# Patient Record
Sex: FEMALE | Race: WHITE | NOT HISPANIC OR LATINO | Employment: STUDENT | URBAN - METROPOLITAN AREA
[De-identification: names, ages, dates, MRNs, and addresses within clinical notes are randomized per-mention and may not be internally consistent; named-entity substitution may affect disease eponyms.]

---
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
## 2020-09-21 ENCOUNTER — OFFICE VISIT (OUTPATIENT)
Dept: OTOLARYNGOLOGY | Facility: CLINIC | Age: 20
End: 2020-09-21
Payer: COMMERCIAL

## 2020-09-21 VITALS — BODY MASS INDEX: 24.53 KG/M2 | HEIGHT: 65 IN | WEIGHT: 147.25 LBS

## 2020-09-21 DIAGNOSIS — J03.91 RECURRENT TONSILLITIS: Primary | ICD-10-CM

## 2020-09-21 DIAGNOSIS — J35.1 TONSILLAR HYPERTROPHY: ICD-10-CM

## 2020-09-21 DIAGNOSIS — R06.83 SNORING: ICD-10-CM

## 2020-09-21 PROCEDURE — 99999 PR PBB SHADOW E&M-EST. PATIENT-LVL III: ICD-10-PCS | Mod: PBBFAC,,, | Performed by: OTOLARYNGOLOGY

## 2020-09-21 PROCEDURE — 99204 PR OFFICE/OUTPT VISIT, NEW, LEVL IV, 45-59 MIN: ICD-10-PCS | Mod: S$GLB,,, | Performed by: OTOLARYNGOLOGY

## 2020-09-21 PROCEDURE — 3008F BODY MASS INDEX DOCD: CPT | Mod: CPTII,S$GLB,, | Performed by: OTOLARYNGOLOGY

## 2020-09-21 PROCEDURE — 99204 OFFICE O/P NEW MOD 45 MIN: CPT | Mod: S$GLB,,, | Performed by: OTOLARYNGOLOGY

## 2020-09-21 PROCEDURE — 3008F PR BODY MASS INDEX (BMI) DOCUMENTED: ICD-10-PCS | Mod: CPTII,S$GLB,, | Performed by: OTOLARYNGOLOGY

## 2020-09-21 PROCEDURE — 99999 PR PBB SHADOW E&M-EST. PATIENT-LVL III: CPT | Mod: PBBFAC,,, | Performed by: OTOLARYNGOLOGY

## 2020-09-21 RX ORDER — PREDNISONE 20 MG/1
40 TABLET ORAL DAILY
Qty: 10 TABLET | Refills: 0 | Status: SHIPPED | OUTPATIENT
Start: 2020-09-21 | End: 2020-09-26

## 2020-09-21 NOTE — PATIENT INSTRUCTIONS
Oral Steroid    You have been prescribed an oral steroid. Use as directed.    Note: This medication can be very effective in treating inflammation but may be associated with several side effects such as:    Stomach irritation (consider antacid medication while you are on prednisone),  insomnia (please take in the morning to reduce this if dosing is once daily), mood changes, high blood pressure, elevated sugar levels (especially in diabetics), infections, fluid retention, rash, swelling, tendon rupture, and hip fracture.     Please report any liver disease, diabetes, osteoporosis, stomach ulcer disease, glaucoma, history of GI bleeding, Myasthenia Gravis PRIOR to initiating this medication.      Understanding Tonsillectomy and Adenoidectomy    Tonsils and adenoids are clusters of tissues in the back of the throat. These tissues form part of the bodys immune system, which helps the body fight disease. If these structures repeatedly become infected or become enlarged, they can lead to problems. They may then be removed with surgery. Surgery to remove the tonsils is called tonsillectomy. In some cases, the adenoids are also removed. This is called adenoidectomy.  Why tonsillectomy and adenoidectomy are done  You may have your tonsils, adenoids, or both removed for reasons that include:  · Infection of the tonsils (tonsillitis) that keeps coming back  · Repeated infections of the throat  · Enlargement of the tonsils or adenoids that affects breathing during sleep. This causes a condition called obstructive sleep apnea.  · Suspected cancer of the throat  Tonsillectomy can remove part or all of the tonsils.  How tonsillectomy and adenoidectomy are done  This surgery is done in a hospital or surgery center. It usually takes less than 1 hour.  · An IV line is inserted in a vein in your arm or hand. This gives you fluids and medicines.  · You are given general anesthesia to put you into a deep sleep through the  procedure.  · A special device is used to hold your mouth open. A tube is put down into your throat to help keep your airway open during the procedure.  · The doctor uses surgical tools to remove the tonsils and possibly the adenoids.  · The doctor removes all of the tools.  · You are sent home when you are awake and recovered from the anesthesia.  Risks of tonsillectomy and adenoidectomy  Risks include:  · Bleeding  · Electric burns of the mouth and lip  · Infection  · Injury to the lips or teeth  · Numbness of the tongue  · Risks of anesthesia  · The need for a second surgery  · Voice changes  Date Last Reviewed: 6/1/2016  © 4415-2715 [employer]. [address] [place] [address], [place] [address]. All rights reserved. This information is not intended as a substitute for professional medical care. Always follow your healthcare professional's instructions.

## 2020-09-21 NOTE — PROGRESS NOTES
Subjective:       Patient ID: Melinda Hanson is a 19 y.o. female.    Chief Complaint: Enlarged tonsils    Melinda is here for tonsil issues.  She has a history of recurrent pharyngitis.  She has been given antibiotics and steroid 3-4 times per year over the past 3 years.  Her throat pain does not resolve prior to the use of antibiotics. Issues worsened over the past few years since starting college. She denies reflux or heartburn. She does snore nightly. She denies sleep concerns or witnessed apneas.  Sleep quality is reduced when she is ill.  Denies history of bleeding disorders.   She had Mono 4 years ago.     Pertinent medical issues: Scoliosis  Previous surgery: Lumbar fusion    Social History     Tobacco Use   Smoking Status Never Smoker   Smokeless Tobacco Never Used     Social History     Substance and Sexual Activity   Alcohol Use No          Review of Systems   Constitutional: Negative for activity change and appetite change.   Eyes: Negative for discharge.   Respiratory: Negative for difficulty breathing and wheezing   Cardiovascular: Negative for chest pain.   Gastrointestinal: Negative for abdominal distention and abdominal pain.   Endocrine: Negative for cold intolerance and heat intolerance.   Genitourinary: Negative for dysuria.   Musculoskeletal: Negative for gait problem and joint swelling.   Skin: Negative for color change and pallor.   Neurological: Negative for syncope and weakness.   Psychiatric/Behavioral: Negative for agitation and confusion.     Objective:        Constitutional:   She is oriented to person, place, and time. She appears well-developed and well-nourished. She appears alert. She is active. Normal speech.      Head:  Normocephalic and atraumatic. Head is without TMJ tenderness. No scars. Salivary glands normal.  Facial strength is normal.      Ears:    Right Ear: No drainage or swelling. No middle ear effusion.   Left Ear: No drainage or swelling.  No middle ear effusion.      Nose:  No mucosal edema, rhinorrhea or sinus tenderness. No turbinate hypertrophy.      Mouth/Throat  Oropharynx clear and moist without lesions or asymmetry, normal uvula midline and mirror exam normal. Normal dentition. No uvula swelling, lacerations or trismus. No oropharyngeal exudate. Tonsils present, +4, tonsillar erythema.  Tonsillar exudate.      Neck:  Full range of motion with neck supple and no adenopathy. Thyroid tenderness is present. No tracheal deviation, no edema, no erythema, normal range of motion, no stridor, no crepitus and no neck rigidity present. No thyroid mass present.     She has cervical adenopathy.     Cardiovascular:   Intact distal pulses and normal pulses.      Pulmonary/Chest:   Effort normal and breath sounds normal. No stridor.     Psychiatric:   Her speech is normal and behavior is normal. Her mood appears not anxious. Her affect is not labile.     Neurological:   She is alert and oriented to person, place, and time. No sensory deficit.     Skin:   No abrasions, lacerations, lesions, or rashes. No abrasion and no bruising noted.         Tests / Results:  none    Assessment:       1. Recurrent tonsillitis    2. Tonsillar hypertrophy    3. Snoring          Plan:       Prednisone x5 days  Continue monitoring, initiate antibiotics if persistent after 7-10 days.  Will message me.    Discussed options of observation vs. Tonsillectomy.    I discussed the risks of tonsillectomy/adenoidectomy, including bleeding, recurrence/persistence of issues (regrowth), need for further procedures, taste changes, injury to mouth/lips, tongue numbness, speech/swallowing changes, VPI.

## 2020-12-28 ENCOUNTER — OFFICE VISIT (OUTPATIENT)
Dept: OBSTETRICS AND GYNECOLOGY | Facility: CLINIC | Age: 20
End: 2020-12-28
Payer: COMMERCIAL

## 2020-12-28 ENCOUNTER — TELEPHONE (OUTPATIENT)
Dept: OBSTETRICS AND GYNECOLOGY | Facility: CLINIC | Age: 20
End: 2020-12-28

## 2020-12-28 VITALS
WEIGHT: 141.56 LBS | DIASTOLIC BLOOD PRESSURE: 76 MMHG | BODY MASS INDEX: 23.58 KG/M2 | HEIGHT: 65 IN | SYSTOLIC BLOOD PRESSURE: 124 MMHG

## 2020-12-28 DIAGNOSIS — N90.89 VULVAR LESION: Primary | ICD-10-CM

## 2020-12-28 PROCEDURE — 3008F BODY MASS INDEX DOCD: CPT | Mod: CPTII,S$GLB,, | Performed by: OBSTETRICS & GYNECOLOGY

## 2020-12-28 PROCEDURE — 99999 PR PBB SHADOW E&M-EST. PATIENT-LVL III: ICD-10-PCS | Mod: PBBFAC,,, | Performed by: OBSTETRICS & GYNECOLOGY

## 2020-12-28 PROCEDURE — 99999 PR PBB SHADOW E&M-EST. PATIENT-LVL III: CPT | Mod: PBBFAC,,, | Performed by: OBSTETRICS & GYNECOLOGY

## 2020-12-28 PROCEDURE — 1125F PR PAIN SEVERITY QUANTIFIED, PAIN PRESENT: ICD-10-PCS | Mod: S$GLB,,, | Performed by: OBSTETRICS & GYNECOLOGY

## 2020-12-28 PROCEDURE — 87529 HSV DNA AMP PROBE: CPT

## 2020-12-28 PROCEDURE — 1125F AMNT PAIN NOTED PAIN PRSNT: CPT | Mod: S$GLB,,, | Performed by: OBSTETRICS & GYNECOLOGY

## 2020-12-28 PROCEDURE — 99203 PR OFFICE/OUTPT VISIT, NEW, LEVL III, 30-44 MIN: ICD-10-PCS | Mod: S$GLB,,, | Performed by: OBSTETRICS & GYNECOLOGY

## 2020-12-28 PROCEDURE — 3008F PR BODY MASS INDEX (BMI) DOCUMENTED: ICD-10-PCS | Mod: CPTII,S$GLB,, | Performed by: OBSTETRICS & GYNECOLOGY

## 2020-12-28 PROCEDURE — 99203 OFFICE O/P NEW LOW 30 MIN: CPT | Mod: S$GLB,,, | Performed by: OBSTETRICS & GYNECOLOGY

## 2020-12-28 RX ORDER — VALACYCLOVIR HYDROCHLORIDE 1 G/1
2000 TABLET, FILM COATED ORAL EVERY 12 HOURS
Qty: 40 TABLET | Refills: 0 | Status: SHIPPED | OUTPATIENT
Start: 2020-12-28 | End: 2020-12-31 | Stop reason: DRUGHIGH

## 2020-12-28 NOTE — PATIENT INSTRUCTIONS
Genital Herpes    Genital herpes is a common sexually transmitted disease (STD). It is caused by the herpes simplex virus (HSV). One out of five teens and adults carry the herpes virus. During an outbreak, it causes small blisters that break open, leaving small, painful sores in the genital area. Eventually, scabs form and the sores heal. In women, these show up most often on the skin just outside the vaginal opening. They can occur on the buttocks, anus, or cervix. In men, the sores are usually on the tip, sides, or base of the penis. They also occur on the scrotum, buttocks, or thighs.  The first outbreak begins within 2 to 3 weeks after exposure to an infected sexual partner. It may last 1 to 3 weeks. It may cause headache, muscle ache, and fevers. The first outbreak is usually the worst. Because the virus remains in the body even after the sores heal, most people will have more outbreaks. The frequency of outbreaks is different for each person. Some people will never have another outbreak. Others will have several episodes a year. Later outbreaks are usually shorter, milder, and less painful. For many, the number of outbreaks tends to decrease over time. Various factors may trigger an outbreak. These include:  · Emotional stress  · Menstruation  · Presence of another illness (cold, flu, or fever from any cause)  · Overexertion and fatigue  · Weakened immune system  Home care  · It is very important that you do not have sexual relations until all the herpes sores have healed completely.  · Wash the affected area gently with mild soap and water. Wash your hands after touching the affected area.  · You may use over-the-counter pain medicine unless another pain medicine was prescribed. (Note: If you have chronic liver or kidney disease or have ever had a stomach ulcer or gastrointestinal bleeding, talk with your healthcare provider before using these medicines. Also talk to your provider if you are taking medicine  to prevent blood clots.) Aspirin should never be given to anyone younger than 18 years of age who is ill with a viral infection or fever. It may cause severe liver or brain damage.  · Your healthcare provider may prescribe antiviral medicine during the first outbreak. This will help the sores heal faster. Antiviral medicine may also be prescribed so that you have it ready to take at the first sign of another outbreak. This will help the symptoms go away sooner. For people with frequent outbreaks, daily therapy may be prescribed. This will help reduce the frequency of attacks. Daily therapy may also reduce risk of spread of herpes to your sexual partner. Discuss the risks and benefits of daily therapy with your healthcare provider.  · If you are a woman who is pregnant now or may become pregnant in the future, let your healthcare provider know that you have had herpes. This may affect the way your baby is delivered.  Preventing spread to others  The virus is spread by sexual contact with someone who has the herpes virus. The risk of spread is highest when the sores are present. However, there is a chance of spreading the virus even when sores are not visible. Inform future sexual partners that you have herpes and that they may become infected. To reduce the risk of passing the virus to a partner who has never had herpes, avoid sexual relations at the first sign of an outbreak and until the sores are fully healed. Latex barriers, such as condoms, reduce the risk of spread between outbreaks if the infected site is covered, but they do not guarantee protection.  Follow-up care  Follow up with your healthcare provider, or as advised.  People who have just learned that they have herpes may feel upset. Getting the facts about herpes can help you feel more in control. Follow up with your healthcare provider or the public health department for complete STD screening, including HIV testing. For more information about herpes,  visit the Centers for Disease Control (CDC) Genital Herpes website at: www.cdc.gov/std/Herpes/default.htm.  When to seek medical advice  Call your healthcare provider right away if any of these occur:  · Inability to urinate due to pain  · Swelling or increasing redness in the genital area  · Unusual drowsiness, weakness, or confusion  · Headache or stiff neck  · Discharge from the vagina or penis  · Increasing back or abdominal pain  · Rash or joint pain  Date Last Reviewed: 9/25/2015 © 2000-2017 [employer]. [address] [place] [address]. All rights reserved. This information is not intended as a substitute for professional medical care. Always follow your healthcare professional's instructions.

## 2020-12-28 NOTE — TELEPHONE ENCOUNTER
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

## 2020-12-28 NOTE — PROGRESS NOTES
"Chief Complaint   Patient presents with    bumps in vaginal area     started a few days ago around issac echavarria       History of Present Illness   19 y.o. F patient presents today for vulvar bumps    Scabbed over  No ulcers  No vaginal discharge  No itching   No odor  New partner in last 6 mtBeena Morse  Contraception: pill  Patient's last menstrual period was 12/25/2020.    Past medical and surgical history reviewed.   I have reviewed the patient's medical history in detail and updated the computerized patient record.  Review of patient's allergies indicates:  No Known Allergies    Review of Systems  GEN ROS: negative for - fever or hot flashes  Breast ROS: negative for - galactorrhea, new or changing breast lumps, nipple changes or nipple discharge  Genito-Urinary ROS: no dysuria, trouble voiding, or hematuria  negative for - genital ulcers, hematuria or vulvar/vaginal symptoms    Physical Examination:  /76   Ht 5' 5" (1.651 m)   Wt 64.2 kg (141 lb 8.6 oz)   LMP 12/25/2020   BMI 23.55 kg/m²    Physical Exam:               Genitourinary:    Inguinal canal normal.         There is lesion on the right labia. There is lesion on the left labia.    Genitourinary Comments: Multiple ulcerative lesions in various stages of healing, presumptive HSV outbreak                     Assessment/Plan:  Vulvar lesion  -     Cancel: HSV by Rapid PCR, Non-Blood [facility]; Vagina; Future; Expected date: 12/28/2020  -     HSV by Rapid PCR, Non-Blood [facility]; Vagina  -     valACYclovir (VALTREX) 1000 MG tablet; Take 2 tablets (2,000 mg total) by mouth every 12 (twelve) hours. for 10 days  Dispense: 40 tablet; Refill: 0        "

## 2020-12-30 LAB
HSV1 DNA SPEC QL NAA+PROBE: POSITIVE
HSV2 DNA SPEC QL NAA+PROBE: NEGATIVE
SPECIMEN SOURCE: ABNORMAL

## 2021-05-10 ENCOUNTER — PATIENT MESSAGE (OUTPATIENT)
Dept: RESEARCH | Facility: HOSPITAL | Age: 21
End: 2021-05-10